# Patient Record
Sex: FEMALE | Race: OTHER | HISPANIC OR LATINO | URBAN - METROPOLITAN AREA
[De-identification: names, ages, dates, MRNs, and addresses within clinical notes are randomized per-mention and may not be internally consistent; named-entity substitution may affect disease eponyms.]

---

## 2023-06-12 ENCOUNTER — EMERGENCY (EMERGENCY)
Facility: HOSPITAL | Age: 88
LOS: 0 days | Discharge: HOME | End: 2023-06-12
Attending: STUDENT IN AN ORGANIZED HEALTH CARE EDUCATION/TRAINING PROGRAM
Payer: MEDICARE

## 2023-06-12 VITALS
DIASTOLIC BLOOD PRESSURE: 78 MMHG | RESPIRATION RATE: 16 BRPM | HEART RATE: 74 BPM | TEMPERATURE: 98 F | OXYGEN SATURATION: 95 % | SYSTOLIC BLOOD PRESSURE: 147 MMHG

## 2023-06-12 VITALS
TEMPERATURE: 98 F | SYSTOLIC BLOOD PRESSURE: 155 MMHG | DIASTOLIC BLOOD PRESSURE: 68 MMHG | OXYGEN SATURATION: 95 % | RESPIRATION RATE: 16 BRPM | HEART RATE: 77 BPM

## 2023-06-12 DIAGNOSIS — L29.9 PRURITUS, UNSPECIFIED: ICD-10-CM

## 2023-06-12 DIAGNOSIS — T63.441A TOXIC EFFECT OF VENOM OF BEES, ACCIDENTAL (UNINTENTIONAL), INITIAL ENCOUNTER: ICD-10-CM

## 2023-06-12 DIAGNOSIS — I44.7 LEFT BUNDLE-BRANCH BLOCK, UNSPECIFIED: ICD-10-CM

## 2023-06-12 PROCEDURE — 93005 ELECTROCARDIOGRAM TRACING: CPT

## 2023-06-12 PROCEDURE — 93010 ELECTROCARDIOGRAM REPORT: CPT

## 2023-06-12 PROCEDURE — 99284 EMERGENCY DEPT VISIT MOD MDM: CPT

## 2023-06-12 PROCEDURE — 99283 EMERGENCY DEPT VISIT LOW MDM: CPT

## 2023-06-12 RX ORDER — DIPHENHYDRAMINE HCL 50 MG
25 CAPSULE ORAL ONCE
Refills: 0 | Status: COMPLETED | OUTPATIENT
Start: 2023-06-12 | End: 2023-06-12

## 2023-06-12 RX ADMIN — Medication 25 MILLIGRAM(S): at 11:36

## 2023-06-12 NOTE — ED STATDOCS - CLINICAL SUMMARY MEDICAL DECISION MAKING FREE TEXT BOX
87F p/w bee sting of hand. No s/s of anaphylaxis. Edema and warms of base of 2nd and 3rd MCP. Dizziness after sting, now resolved. Plan for benadryl and ekg.

## 2023-06-12 NOTE — ED ADULT NURSE NOTE - OBJECTIVE STATEMENT
Pt is an 87yr old female, A&OX4 and ambulatory, c/o bee sting to the R hand at 11am today. Endorses R hand swelling and itchiness. Has never been stung by a bee before. Resp. even and unlabored. Airway patent. No known allergies. Denies any chest pain, SOB, difficulty swallowing/talking. No meds taken PTA. In NAD.

## 2023-06-12 NOTE — ED STATDOCS - ATTENDING CONTRIBUTION TO CARE
I, Priscilla Gilmore DO,  performed the initial face to face bedside interview with this patient regarding history of present illness, review of symptoms and relevant past medical, social and family history.  I completed an independent physical examination.  I was the initial provider who evaluated this patient. I have signed out the follow up of any pending tests (i.e. labs, radiological studies) to the resident.  I have communicated the patient’s plan of care and disposition with the resident.

## 2023-06-12 NOTE — ED STATDOCS - CARE PROVIDER_API CALL
Tha Garcia)  Cardiology  [address] [place] [address]  Phone: [phone]  Fax: [phone]  Follow Up Time: 4-6 Days

## 2023-06-12 NOTE — ED STATDOCS - PATIENT PORTAL LINK FT
You can access the FollowMyHealth Patient Portal offered by [facility] by registering at the following website: [email or web address]. By joining [employer]’s FollowMyHealth portal, you will also be able to view your health information using other applications (apps) compatible with our system.

## 2023-06-12 NOTE — ED ADULT NURSE NOTE - NSFALLUNIVINTERV_ED_ALL_ED
Bed/Stretcher in lowest position, wheels locked, appropriate side rails in place/Call bell, personal items and telephone in reach/Instruct patient to call for assistance before getting out of bed/chair/stretcher/Non-slip footwear applied when patient is off stretcher/[place] to call system/Physically safe environment - no spills, clutter or unnecessary equipment/Purposeful proactive rounding/Room/bathroom lighting operational, light cord in reach

## 2023-06-12 NOTE — ED ADULT TRIAGE NOTE - CHIEF COMPLAINT QUOTE
Pt BIBEMS from bee sting to R hand.  Pt has no SOB, appears to be in no distress in EMS.  This is first time pt has been stung by a bee.  Pt endorses itchiness to hands.  As per EMTs, pt has R hand swelling.  Pt has no allergies.

## 2023-06-12 NOTE — ED STATDOCS - OBJECTIVE STATEMENT
87-year-old female with history of heart murmur presenting with bee sting to right hand.  Patient was trying to remove a beehive when she was stung.  She felt dizzy after the sting which is now resolved.  Denies shortness of breath, nausea, vomiting.  She reports localized swelling of the right dorsal aspect hand. She has never been stung before and she did not take any medications prior to arrival.

## 2023-06-12 NOTE — ED STATDOCS - NSFOLLOWUPINSTRUCTIONS_ED_ALL_ED_FT
1. You presented to the emergency department for: BEE STING    2. Your evaluation in the emergency department included a physician evaluation. Your work-up did not reveal any findings indicating the need for admission to the hospital or any emergent interventions at this time.     3. It is recommended that you follow-up with your primary care doctor within the week.     If needed, to arrange an appointment with a primary care provider please call: [phone]    4. Please continue taking your regular medications as prescribed.     5. PLEASE RETURN TO THE EMERGENCY DEPARTMENT IMMEDIATELY IF you develop any fevers not responding to over the counter medications, uncontrollable nausea and vomiting, an inability to tolerate eating and drinking, difficulty breathing, chest pain, a severe increase in your symptoms or pain, or any other new symptoms that concern you. 1. You presented to the emergency department for: BEE STING    2. You had a screening EKG which showed a LEFT BUNDLE BRANCH BLOCK. Please follow up with CARDIOLOGY for further evaluation.     If needed, to arrange an appointment with a primary care provider please call: [phone]    3. Please continue taking your regular medications as prescribed.     5. PLEASE RETURN TO THE EMERGENCY DEPARTMENT IMMEDIATELY IF you develop any shortness of breath, chest pain, uncontrollable nausea and vomiting, an inability to tolerate eating and drinking, difficulty breathing, chest pain, a severe increase in your symptoms or pain, or any other new symptoms that concern you.

## 2023-06-12 NOTE — ED STATDOCS - PROGRESS NOTE DETAILS
Chantal, PGY3: EKG with LBBB otherwise no acute ischemic changes. EKG was performed as screening tool given patient had acute dizziness following bee sting. Discussed with patient and attempted to contact her PMD, however no EKG on file for comparison. Patient is asymptomatic aside from pruritic rash. Denies chest pain, shortness of breath, nausea. Extensive discussion with patient and daughter regarding findings and follow up with cardiology.